# Patient Record
Sex: MALE | Race: WHITE
[De-identification: names, ages, dates, MRNs, and addresses within clinical notes are randomized per-mention and may not be internally consistent; named-entity substitution may affect disease eponyms.]

---

## 2019-01-15 ENCOUNTER — HOSPITAL ENCOUNTER (OUTPATIENT)
Dept: HOSPITAL 19 - SDCO | Age: 74
LOS: 2 days | Discharge: NURSING, REHAB OR LONG-TERM CARE | End: 2019-01-17
Attending: UROLOGY
Payer: MEDICARE

## 2019-01-15 VITALS — DIASTOLIC BLOOD PRESSURE: 79 MMHG | HEART RATE: 94 BPM | SYSTOLIC BLOOD PRESSURE: 134 MMHG | TEMPERATURE: 97.9 F

## 2019-01-15 VITALS — HEART RATE: 95 BPM | SYSTOLIC BLOOD PRESSURE: 106 MMHG | DIASTOLIC BLOOD PRESSURE: 53 MMHG

## 2019-01-15 VITALS — SYSTOLIC BLOOD PRESSURE: 110 MMHG | HEART RATE: 97 BPM | DIASTOLIC BLOOD PRESSURE: 673 MMHG

## 2019-01-15 VITALS — DIASTOLIC BLOOD PRESSURE: 69 MMHG | SYSTOLIC BLOOD PRESSURE: 121 MMHG | HEART RATE: 88 BPM

## 2019-01-15 VITALS — HEART RATE: 86 BPM | DIASTOLIC BLOOD PRESSURE: 61 MMHG | SYSTOLIC BLOOD PRESSURE: 121 MMHG

## 2019-01-15 VITALS — SYSTOLIC BLOOD PRESSURE: 120 MMHG | HEART RATE: 95 BPM | DIASTOLIC BLOOD PRESSURE: 64 MMHG

## 2019-01-15 VITALS — DIASTOLIC BLOOD PRESSURE: 82 MMHG | SYSTOLIC BLOOD PRESSURE: 129 MMHG | HEART RATE: 87 BPM

## 2019-01-15 VITALS — DIASTOLIC BLOOD PRESSURE: 67 MMHG | SYSTOLIC BLOOD PRESSURE: 123 MMHG | HEART RATE: 90 BPM

## 2019-01-15 VITALS — HEART RATE: 90 BPM | DIASTOLIC BLOOD PRESSURE: 84 MMHG | SYSTOLIC BLOOD PRESSURE: 118 MMHG

## 2019-01-15 VITALS — HEIGHT: 70 IN | WEIGHT: 194.45 LBS | BODY MASS INDEX: 27.84 KG/M2

## 2019-01-15 DIAGNOSIS — M54.5: ICD-10-CM

## 2019-01-15 DIAGNOSIS — Z79.84: ICD-10-CM

## 2019-01-15 DIAGNOSIS — Z87.440: ICD-10-CM

## 2019-01-15 DIAGNOSIS — R39.14: ICD-10-CM

## 2019-01-15 DIAGNOSIS — F32.9: ICD-10-CM

## 2019-01-15 DIAGNOSIS — K21.9: ICD-10-CM

## 2019-01-15 DIAGNOSIS — G89.29: ICD-10-CM

## 2019-01-15 DIAGNOSIS — G81.90: ICD-10-CM

## 2019-01-15 DIAGNOSIS — Z79.899: ICD-10-CM

## 2019-01-15 DIAGNOSIS — N39.41: ICD-10-CM

## 2019-01-15 DIAGNOSIS — F79: ICD-10-CM

## 2019-01-15 DIAGNOSIS — E78.5: ICD-10-CM

## 2019-01-15 DIAGNOSIS — Z87.820: ICD-10-CM

## 2019-01-15 DIAGNOSIS — F99: ICD-10-CM

## 2019-01-15 DIAGNOSIS — N40.1: Primary | ICD-10-CM

## 2019-01-15 DIAGNOSIS — F03.90: ICD-10-CM

## 2019-01-15 DIAGNOSIS — F17.220: ICD-10-CM

## 2019-01-15 DIAGNOSIS — E11.9: ICD-10-CM

## 2019-01-15 DIAGNOSIS — N32.3: ICD-10-CM

## 2019-01-15 DIAGNOSIS — R20.0: ICD-10-CM

## 2019-01-15 DIAGNOSIS — Z99.3: ICD-10-CM

## 2019-01-15 NOTE — NUR
NURSING HOME CALLED AND STATED PATIENT DID NOT STOP CHEWING TOBACCO AS
ORDERED.
M.WILMA CRNA INFORMED -OK TO HAVE SURGERY.
INSTRUTED NURSING HOME STAFF NO FURTHER TOBACCO OR ANYTHING TO EAT/DRINK.

## 2019-01-15 NOTE — NUR
Patient resting in bed sleeping.  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
RLE. Patient also appeared to be having bladder spasm, Dr. Hayes notifed
& mcduffie given per orders.  Patient now resting much more comfortably.  Vss on
O2. Patient provided with fresh linens & pericare, he was incontinent of
stool.  His niece was at bedside, but went home for the evening. Will assisted
patient with dinner. Will report off to night nurse

## 2019-01-15 NOTE — NUR
Patient in bed resting. Alert and oriented x 3. Shift assessment complete.
Wiggins to dependent drainage with pink urine present, ocassional clots noted.
CBI infusing at moderate rate. Denies pain at this time. Denies further needs
at this time.

## 2019-01-16 VITALS — TEMPERATURE: 98.5 F | SYSTOLIC BLOOD PRESSURE: 165 MMHG | HEART RATE: 67 BPM | DIASTOLIC BLOOD PRESSURE: 76 MMHG

## 2019-01-16 VITALS — DIASTOLIC BLOOD PRESSURE: 59 MMHG | TEMPERATURE: 98.1 F | HEART RATE: 93 BPM | SYSTOLIC BLOOD PRESSURE: 108 MMHG

## 2019-01-16 VITALS — TEMPERATURE: 98.1 F | HEART RATE: 83 BPM | SYSTOLIC BLOOD PRESSURE: 122 MMHG | DIASTOLIC BLOOD PRESSURE: 72 MMHG

## 2019-01-16 VITALS — HEART RATE: 94 BPM | DIASTOLIC BLOOD PRESSURE: 65 MMHG | TEMPERATURE: 98 F | SYSTOLIC BLOOD PRESSURE: 115 MMHG

## 2019-01-16 VITALS — DIASTOLIC BLOOD PRESSURE: 71 MMHG | SYSTOLIC BLOOD PRESSURE: 115 MMHG | HEART RATE: 86 BPM | TEMPERATURE: 97.7 F

## 2019-01-16 VITALS — DIASTOLIC BLOOD PRESSURE: 75 MMHG | TEMPERATURE: 99.1 F | HEART RATE: 91 BPM | SYSTOLIC BLOOD PRESSURE: 130 MMHG

## 2019-01-16 NOTE — NUR
Patient resting in bedside recliner watching television at this time. CBI
running at a slow rate, urine is intermittently bloody and clear, no evidence
of clots. Patient stated he needed to have a bowel movement, 1x assist to
bathroom, gait was unsteady and patient stated he felt that his right leg
would "give out", patient refused walker, stating that he had not had success
using them before. Patient was able to have a bowel movment and then returned
to bed. Denies pain or further needs at this time, call light within reach.

## 2019-01-16 NOTE — NUR
Patient has rested well through the night. Minimal needs. Wiggins maintained to
dependent drainage with clear yellow urine present. CBI infusing at a very
slow rate. Continues to deny pain. Denies further needs at this time. Will
report off to day shift.

## 2019-01-16 NOTE — NUR
Patient continues to sit up in the chair. He has had a good day. Cbi continues
to infuse at a slow rate, intermittent bladder spasms & red tinged output &
then it will clear up. Tylenol & levsin as needed for pain. Dr. Cody
rounded & plan of care reviewed plans to prime & pull in AM.  He is tolerating
all meals. Insulin & medications per orders. Int. Will report off to night
nurse

## 2019-01-16 NOTE — NUR
SANCHEZ met with the patient to discuss discharge plan. The patient resides at
[facility] for long-term care. SANCHEZ contacted the patient's
niece, Nicole JIMMY-JOSE DANIEL, and confirmed plan. SANCHEZ received a verbal consent from
the patient's niece for patient choice form. SANCHEZ contacted and faxed updates to
Katharina at [facility] and will continue to follow.

## 2019-01-16 NOTE — NUR
Patient assisted up to the chair this am. 2 assist but he did fairly well. He
did report a sore bottom. TYelnol for pain this am. He also appeared to have
bladder spasm at times, PRN levsin for pain manged. Dr. Hayes called to
check in on patient & update given. Cbi continues at a slow drip with red
tinged output. He did well with breakfast & took all am medications. IV to
INT. Patient was assisted with oral hygiene & silverman cath care. Will monitor.

## 2019-01-17 VITALS — DIASTOLIC BLOOD PRESSURE: 83 MMHG | SYSTOLIC BLOOD PRESSURE: 105 MMHG | HEART RATE: 85 BPM | TEMPERATURE: 99 F

## 2019-01-17 VITALS — SYSTOLIC BLOOD PRESSURE: 113 MMHG | DIASTOLIC BLOOD PRESSURE: 64 MMHG | TEMPERATURE: 97.8 F | HEART RATE: 67 BPM

## 2019-01-17 VITALS — TEMPERATURE: 98.3 F | DIASTOLIC BLOOD PRESSURE: 69 MMHG | SYSTOLIC BLOOD PRESSURE: 115 MMHG | HEART RATE: 75 BPM

## 2019-01-17 VITALS — DIASTOLIC BLOOD PRESSURE: 64 MMHG | SYSTOLIC BLOOD PRESSURE: 113 MMHG | TEMPERATURE: 97.8 F | HEART RATE: 67 BPM

## 2019-01-17 VITALS — SYSTOLIC BLOOD PRESSURE: 116 MMHG | HEART RATE: 86 BPM | DIASTOLIC BLOOD PRESSURE: 70 MMHG | TEMPERATURE: 97.6 F

## 2019-01-17 VITALS — TEMPERATURE: 98.6 F | SYSTOLIC BLOOD PRESSURE: 100 MMHG | DIASTOLIC BLOOD PRESSURE: 63 MMHG | HEART RATE: 92 BPM

## 2019-01-17 NOTE — NUR
PATIENT ASSISTED TO THE CHAIR THIS MORNING. RIGHT-SIDED WEAKNESS NOTED.
PATIENT IS A&O TO SELF. VSS. UPPER LUNG LOBES CLEAR UPON AUSCULTATION. BASES
COARSE BILATERALLY. PATIENT DENIES A PRODUCTIVE COUGH OR SHORTNESS OF BREATH.
BOWEL SOUNDS ACTIVE ALL FOUR QUADRANTS. ABDOMEN DISTENDED BUT SOFT TO
PALPATION. PATIENT TOLERATING FOOD & LIQUIDS WITHOUT ANY COMPLAINTS OF N/V.
POSITIVE PEDAL PULSES EQUAL BILATERALLY. CAP REFILL <3 SECONDS. PATIENT
VOIDING SUFFICIENTLY POST PRIME & PULL. URINE IS ORANGE AND CLEAR. LEFT HAND
TO INT. CALL LIGHT WITHIN REACH. CHAIR ALARM ON. BREAKFAST TRAY ORDERED.
PATIENT DENIES PAIN AT THIS TIME. NO OTHER NEEDS.

## 2019-01-17 NOTE — NUR
The patient is to discharge today, 1/17, back to [facility] for
long-term care. Transportation was set for around 1500, via [facility]. SANCHEZ informed
the patient's nurse and attempted to contact the patient's niece, Nicole. SW
left a voicemail. No additional needs at this time.

## 2019-01-17 NOTE — NUR
Silverman catheter was removed via prime and pull this morning. Patient tolerated
procedure well, but could only tolerate approximately 150ml instilled into
bladder. Urine in silverman bag was only blood tinged this morning, no blood
apparent on removal. Patient was educated on six cup routine and importance of
using urinal and reporting each void for first six voids. Patient verbalized
understanding. Patient denies further needs at this time, call light within
reach.

## 2019-01-17 NOTE — NUR
PATIENT'S LEFT HAND INT DC'D PER PENDING DISCHARGE. PATIENT TOLERATED WELL. Reason For Visit  LUCIANO FRIEDMAN is a new patient here today for a chief complaint of checked for STD . gf recently tested postivie for trichonomas. declines flu shot.   :  services not used.   LUCIANO FRIEDMAN was offered a chaperone, but declined. He is unaccompanied.        Quality    Adult Wellness CI height documented, discussion of regular exercise, exercising regularly, printed information given for activities, discussion of nutritional quality of diet, patient education given about proper diet, alcohol use, not having considered quitting drinking, not getting angry when talked to about drinking, not having a drink or two in the morning to get going, not drinking alcohol regularly, and feeling guilty about it, no tobacco use, does not have feelings of hopelessness (PHQ-2), no Anhedonia (PHQ-2), not referred to local mental health center, not taking medication for depression, monitoring patient and no preventive medicine therapy for influenza.      History of Present Illness  pt here for new patient establishment. states was told that his girlfriend has trichomonas. pt denies any urinary symptoms or discharge.  pt states had gonorrhea in past.   no abd pains, fevers or chills.      Review of Systems    Const: no fever and no chills.   Resp: no cough.   GI: no abdominal pain, no nausea, no vomiting and no diarrhea.   : no change in urinary frequency, no feelings of urinary urgency, no dysuria and no hematuria.   Neuro: no headache.   Skin: no skin lesions and no rash.       Allergies  No Known Drug Allergies    Past Medical History  History of asthma (Z87.09)  History of gonorrhea (Z86.19)    Family History  Mother   Family history of asthma (Z82.5)  Maternal Uncle   Family history of hypertension (Z82.49)    Social History  Former smoker (Z87.891)  Regular alcohol consumption (Z78.9)   one beer/day    Review  Past medical history, problem list, family medical history, surgical history and social  history reviewed.      Vitals  Signs   Recorded: [id number] 07:15AM   Height: 6 ft 0.5 in  Weight: 173 lb   BMI Calculated: 23.14  BSA Calculated: 2.01  Systolic: 124, LUE  Diastolic: 82, LUE  Temperature: 98.2 F  Heart Rate: 82  O2 Saturation: 96    Physical Exam  Constitutional: alert, in no acute distress and current vital signs reviewed.   Head and Face: atraumatic, no deformities, normocephalic, normal facies.   Eyes: no discharge, normal conjunctiva, no eyelid swelling, no ptosis and the sclerae were normal. pupils equal, round and reactive to light and accommodation, conjugate gaze and extraocular movements were intact.   ENT: normal appearing outer ear, normal appearing nose. examination of the tympanic membrane showed normal landmarks, normal appearing external canal. nasal mucosa moist and pink, no nasal discharge. normal lips. oral mucosa pink and moist, no oral lesions.   Neck: normal appearing neck, supple neck and no mass was seen. thyroid not enlarged and no thyroid nodules.   Lymphatic: no lymphadenopathy.   Pulmonary: no respiratory distress, normal respiratory rate and effort and no accessory muscle use. some inp and ex wheezes. pt states getting over a cold.   Cardiovascular: normal rate, no murmurs were heard, regular rhythm, normal S1 and normal S2. edema was not present in the lower extremities.   Abdomen: soft, nontender, nondistended, normal bowel sounds and no abdominal mass. no hepatomegaly and no splenomegaly. no umbilical hernia was discovered.   Skin, Hair, Nails: normal skin color and pigmentation and no rash. no foot ulcers and no skin ulcer was seen. normal skin turgor. no clubbing or cyanosis of the fingernails.      Assessment  Screen for STD (sexually transmitted disease) (Z11.3)  Trichomonas exposure (Z20.2)  Wheezing (R06.2)    Plan  MetroNIDAZOLE 500 MG Oral Tablet; Take 4 tablets as one dose  ProAir  (90 Base) MCG/ACT Inhalation Aerosol Solution; INHALE 2 PUFFS  BY MOUTH  EVERY 4-6 HOURS AS NEEDED  CHLAMYDIA / GC BY NUCLEIC ACID AMPLIFICATION; Status:Active; Requested  for:[id number];   : URINE  HIV ANTIGEN/ANTIBODY SCREEN; [Do Not Release]; Status:Active; Requested  for:[id number];   RPR; Status:Active; Requested for:[id number];   Trichomonas Vaginalis DARLENE; Status:Active; Requested for:[id number];   Plans:     Plan: (discussed metronizadole and side effects. use condoms at all times. f/u as needed).      Signatures   Electronically signed by : FIDEL VILLATORO NP; Sep 25 2018  7:39AM CST

## 2023-01-06 NOTE — NUR
PATIENT PERSONAL BELONGINGS GATHERED. PATIENT TAKEN TO PRIVATE TRANSPORT
VEHICLE VIA WHEELCHAIR BY SURGICAL STAFF. PATIENT TRANSFERRED. Consent 2/Introductory Paragraph: Mohs surgery was explained to the patient and consent was obtained. The risks, benefits and alternatives to therapy were discussed in detail. Specifically, the risks of infection, scarring, bleeding, prolonged wound healing, incomplete removal, allergy to anesthesia, nerve injury and recurrence were addressed. Prior to the procedure, the treatment site was clearly identified and confirmed by the patient. All components of Universal Protocol/PAUSE Rule completed.